# Patient Record
Sex: MALE | Race: BLACK OR AFRICAN AMERICAN | NOT HISPANIC OR LATINO | URBAN - METROPOLITAN AREA
[De-identification: names, ages, dates, MRNs, and addresses within clinical notes are randomized per-mention and may not be internally consistent; named-entity substitution may affect disease eponyms.]

---

## 2020-11-10 ENCOUNTER — EMERGENCY (EMERGENCY)
Facility: HOSPITAL | Age: 25
LOS: 0 days | Discharge: HOME | End: 2020-11-10
Attending: EMERGENCY MEDICINE | Admitting: EMERGENCY MEDICINE
Payer: MEDICAID

## 2020-11-10 VITALS
SYSTOLIC BLOOD PRESSURE: 128 MMHG | OXYGEN SATURATION: 97 % | HEART RATE: 105 BPM | RESPIRATION RATE: 20 BRPM | DIASTOLIC BLOOD PRESSURE: 76 MMHG | TEMPERATURE: 102 F

## 2020-11-10 DIAGNOSIS — J45.909 UNSPECIFIED ASTHMA, UNCOMPLICATED: ICD-10-CM

## 2020-11-10 DIAGNOSIS — Z79.2 LONG TERM (CURRENT) USE OF ANTIBIOTICS: ICD-10-CM

## 2020-11-10 DIAGNOSIS — J03.90 ACUTE TONSILLITIS, UNSPECIFIED: ICD-10-CM

## 2020-11-10 DIAGNOSIS — R11.2 NAUSEA WITH VOMITING, UNSPECIFIED: ICD-10-CM

## 2020-11-10 DIAGNOSIS — R10.9 UNSPECIFIED ABDOMINAL PAIN: ICD-10-CM

## 2020-11-10 LAB
ALBUMIN SERPL ELPH-MCNC: 4.8 G/DL — SIGNIFICANT CHANGE UP (ref 3.5–5.2)
ALP SERPL-CCNC: 63 U/L — SIGNIFICANT CHANGE UP (ref 30–115)
ALT FLD-CCNC: 9 U/L — SIGNIFICANT CHANGE UP (ref 0–41)
ANION GAP SERPL CALC-SCNC: 16 MMOL/L — HIGH (ref 7–14)
AST SERPL-CCNC: 16 U/L — SIGNIFICANT CHANGE UP (ref 0–41)
BASOPHILS # BLD AUTO: 0.02 K/UL — SIGNIFICANT CHANGE UP (ref 0–0.2)
BASOPHILS NFR BLD AUTO: 0.2 % — SIGNIFICANT CHANGE UP (ref 0–1)
BILIRUB SERPL-MCNC: 0.4 MG/DL — SIGNIFICANT CHANGE UP (ref 0.2–1.2)
BUN SERPL-MCNC: 11 MG/DL — SIGNIFICANT CHANGE UP (ref 10–20)
CALCIUM SERPL-MCNC: 9.7 MG/DL — SIGNIFICANT CHANGE UP (ref 8.5–10.1)
CHLORIDE SERPL-SCNC: 97 MMOL/L — LOW (ref 98–110)
CO2 SERPL-SCNC: 21 MMOL/L — SIGNIFICANT CHANGE UP (ref 17–32)
CREAT SERPL-MCNC: 1.1 MG/DL — SIGNIFICANT CHANGE UP (ref 0.7–1.5)
EOSINOPHIL # BLD AUTO: 0 K/UL — SIGNIFICANT CHANGE UP (ref 0–0.7)
EOSINOPHIL NFR BLD AUTO: 0 % — SIGNIFICANT CHANGE UP (ref 0–8)
GLUCOSE SERPL-MCNC: 106 MG/DL — HIGH (ref 70–99)
HCT VFR BLD CALC: 45 % — SIGNIFICANT CHANGE UP (ref 42–52)
HGB BLD-MCNC: 14.8 G/DL — SIGNIFICANT CHANGE UP (ref 14–18)
IMM GRANULOCYTES NFR BLD AUTO: 0.3 % — SIGNIFICANT CHANGE UP (ref 0.1–0.3)
LYMPHOCYTES # BLD AUTO: 1.34 K/UL — SIGNIFICANT CHANGE UP (ref 1.2–3.4)
LYMPHOCYTES # BLD AUTO: 12.7 % — LOW (ref 20.5–51.1)
MCHC RBC-ENTMCNC: 28 PG — SIGNIFICANT CHANGE UP (ref 27–31)
MCHC RBC-ENTMCNC: 32.9 G/DL — SIGNIFICANT CHANGE UP (ref 32–37)
MCV RBC AUTO: 85.1 FL — SIGNIFICANT CHANGE UP (ref 80–94)
MONOCYTES # BLD AUTO: 1.33 K/UL — HIGH (ref 0.1–0.6)
MONOCYTES NFR BLD AUTO: 12.6 % — HIGH (ref 1.7–9.3)
NEUTROPHILS # BLD AUTO: 7.85 K/UL — HIGH (ref 1.4–6.5)
NEUTROPHILS NFR BLD AUTO: 74.2 % — SIGNIFICANT CHANGE UP (ref 42.2–75.2)
NRBC # BLD: 0 /100 WBCS — SIGNIFICANT CHANGE UP (ref 0–0)
PLATELET # BLD AUTO: 177 K/UL — SIGNIFICANT CHANGE UP (ref 130–400)
POTASSIUM SERPL-MCNC: 3.5 MMOL/L — SIGNIFICANT CHANGE UP (ref 3.5–5)
POTASSIUM SERPL-SCNC: 3.5 MMOL/L — SIGNIFICANT CHANGE UP (ref 3.5–5)
PROT SERPL-MCNC: 8 G/DL — SIGNIFICANT CHANGE UP (ref 6–8)
RBC # BLD: 5.29 M/UL — SIGNIFICANT CHANGE UP (ref 4.7–6.1)
RBC # FLD: 12.4 % — SIGNIFICANT CHANGE UP (ref 11.5–14.5)
SODIUM SERPL-SCNC: 134 MMOL/L — LOW (ref 135–146)
WBC # BLD: 10.57 K/UL — SIGNIFICANT CHANGE UP (ref 4.8–10.8)
WBC # FLD AUTO: 10.57 K/UL — SIGNIFICANT CHANGE UP (ref 4.8–10.8)

## 2020-11-10 PROCEDURE — 99284 EMERGENCY DEPT VISIT MOD MDM: CPT

## 2020-11-10 RX ORDER — SODIUM CHLORIDE 9 MG/ML
1000 INJECTION, SOLUTION INTRAVENOUS ONCE
Refills: 0 | Status: COMPLETED | OUTPATIENT
Start: 2020-11-10 | End: 2020-11-10

## 2020-11-10 RX ORDER — AMOXICILLIN 250 MG/5ML
1 SUSPENSION, RECONSTITUTED, ORAL (ML) ORAL
Qty: 20 | Refills: 0
Start: 2020-11-10 | End: 2020-11-19

## 2020-11-10 RX ORDER — ONDANSETRON 8 MG/1
4 TABLET, FILM COATED ORAL ONCE
Refills: 0 | Status: COMPLETED | OUTPATIENT
Start: 2020-11-10 | End: 2020-11-10

## 2020-11-10 RX ORDER — KETOROLAC TROMETHAMINE 30 MG/ML
15 SYRINGE (ML) INJECTION ONCE
Refills: 0 | Status: DISCONTINUED | OUTPATIENT
Start: 2020-11-10 | End: 2020-11-10

## 2020-11-10 RX ADMIN — ONDANSETRON 4 MILLIGRAM(S): 8 TABLET, FILM COATED ORAL at 17:44

## 2020-11-10 RX ADMIN — Medication 15 MILLIGRAM(S): at 17:44

## 2020-11-10 RX ADMIN — SODIUM CHLORIDE 1000 MILLILITER(S): 9 INJECTION, SOLUTION INTRAVENOUS at 17:44

## 2020-11-10 NOTE — ED PROVIDER NOTE - NSFOLLOWUPINSTRUCTIONS_ED_ALL_ED_FT
Please follow up with your primary care provider in 1-3 days for further evaluation. Return to the emergency department sooner for any new or worsening symptoms.     Tonsillitis    WHAT YOU NEED TO KNOW:    Tonsillitis is inflammation of your tonsils. Tonsils are the lumps of tissue on both sides of the back of your throat. Tonsils are part of your immune system. They help you fight infections. Recurrent tonsillitis is when you have tonsillitis many times in 1 year. Chronic tonsillitis is when you have a sore throat that lasts 3 months or longer. Mouth Anatomy         DISCHARGE INSTRUCTIONS:    Medicines: You may need any of the following:     Acetaminophen decreases pain and fever. It is available without a doctor's order. Ask how much to take and how often to take it. Follow directions. Acetaminophen can cause liver damage if not taken correctly.      NSAIDs, such as ibuprofen, help decrease swelling, pain, and fever. This medicine is available with or without a doctor's order. NSAIDs can cause stomach bleeding or kidney problems in certain people. If you take blood thinner medicine, always ask your healthcare provider if NSAIDs are safe for you. Always read the medicine label and follow directions.      Antibiotics help treat a bacterial infection.      Take your medicine as directed. Contact your healthcare provider if you think your medicine is not helping or if you have side effects. Tell him or her if you are allergic to any medicine. Keep a list of the medicines, vitamins, and herbs you take. Include the amounts, and when and why you take them. Bring the list or the pill bottles to follow-up visits. Carry your medicine list with you in case of an emergency.    Call 911 for the following:     You have trouble breathing because your tonsils are swollen.        Contact your healthcare provider if:     You have a fever.      Your pain gets worse or does not get better after you take pain medicine.      Your sore throat is not better after you have finished antibiotic treatment.      You have trouble sleeping and wake up trying to catch your breath.      You have questions or concerns about your condition or care.    Rest when you feel it is needed. Slowly start to do more each day. Return to your daily activities as directed.     Drink liquids as directed: You may need to drink more liquid than usual to help prevent dehydration. Ask how much liquid to drink each day and which liquids are best for you.     Gargle with warm salt water: This may help decrease throat pain. Mix 1 teaspoon of salt in 8 ounces of warm water. Ask how often you should do this.    Prevent the spread of germs: Wash your hands often. Do not share food or drinks with anyone. You may be able to return to work when you feel better and your fever is gone for at least 24 hours.    Follow up with your healthcare provider as directed: Write down your questions so you remember to ask them during your visits.

## 2020-11-10 NOTE — ED PROVIDER NOTE - ATTENDING CONTRIBUTION TO CARE
24yo man h/o asthma, otherwise healthy, c/o nausea/vomiting over the last 4 days associated with subjective fever, sore throat, crampy abd pain. No chest pain, SOB, cough. On exam he is alert, looks uncomfortable due to nausea but not acutely toxic, throat with L tonsillar hypertrophy, erythema and exudate, uvula midline, lungs CTA, CVS1S2 RRR abd soft, NT, ND. Sx suggestive of viral syndrome vs strep pharyngitis, will tx symptomatically, reassess.    Of note, pt refused COVID swab despite explanation of risks of infecting others around him, etc. Advised that he will need to quarantine at home.

## 2020-11-10 NOTE — ED PROVIDER NOTE - NS ED ROS FT
Review of Systems:  	•	CONSTITUTIONAL - +subj fever, no diaphoresis  	•	SKIN - no rash, no lesions  	•	HEMATOLOGIC - no bleeding, no bruising  	•	EYES - no discharge, no injection  	•	ENT - +sore throat, no runny nose  	•	RESPIRATORY - no shortness of breath, no cough  	•	CARDIAC - no chest pain, no palpitations  	•	GI - +abd pain, +nausea, +vomiting, no diarrhea  	•	GENITO-URINARY - no dysuria, no hematuria  	•	MUSCULOSKELETAL - no joint pain, no muscle aches  	•	NEUROLOGIC - no dizziness, no headache

## 2020-11-10 NOTE — ED PROVIDER NOTE - PATIENT PORTAL LINK FT
You can access the FollowMyHealth Patient Portal offered by [facility] by registering at the following website: [email or web address]. By joining [facility]’s FollowMyHealth portal, you will also be able to view your health information using other applications (apps) compatible with our system.

## 2020-11-10 NOTE — ED PROVIDER NOTE - PHYSICAL EXAMINATION
Vital Signs: Reviewed  GEN: alert, NAD, speaks full sentences  HEAD:  normocephalic, atraumatic  EYES:  PERRLA; conjunctivae without injection, drainage or discharge  ENMT:  nasal mucosa moist; mouth moist without ulcerations or lesions; LT tonsil edematous w/ copious exudates, RT tonsil normal, no uvular deviation  NECK:  supple  CARDIAC:  regular rate, normal S1 and S2, no murmurs  RESP:  respiratory rate and effort appear normal for age; lungs are clear to auscultation bilaterally; no rales or wheezes  ABDOMEN:  soft, nontender, nondistended  MUSCULOSKELETAL/NEURO:  normal movement, normal tone  SKIN:  normal skin color for age and race, well-perfused; warm and dry

## 2020-11-10 NOTE — ED PROVIDER NOTE - PROGRESS NOTE DETAILS
AG: pt declined covid swab. Reports improved symptoms, tolerating PO. Strict return precautions given.

## 2020-11-10 NOTE — ED PROVIDER NOTE - CLINICAL SUMMARY MEDICAL DECISION MAKING FREE TEXT BOX
Labs ok, pt feeling much better with hydration and NSAIDs. Tolerating PO. Sx could be viral vs strep, pt declined throat swab and covid swab, will cover with abx, rec quarantine and PMD f/u.

## 2020-11-10 NOTE — ED PROVIDER NOTE - PSH
[facility] Discharge Instructions Endoscopy      5/28/2020    During your exam, the physician:    Completed a thorough exam, biopsy/biopsies obtained and Lab results will be called/mailed to you within 7-14 days.  If you have not heard from the doctor within 10 days, call the office for results:      DIET INSTRUCTIONS:  Resume your regular diet and Advance your diet as you tolerate it    PRESCRIPTIONS/MEDICATIONS  No new prescriptions given today    A RESPONSIBLE ADULT MUST ACCOMPANY YOU AND DRIVE YOU HOME    You had the following procedure(s) today:   Upper Endoscopy     1. A biopsy/biopsies obtained today  2. Following sedation, your judgment, perception and coordination are impaired for a minimum of 24 hours.   Therefore:  · Do not drive. Do not return to work today.  · It is strongly recommended to have someone stay with you at home the day of discharge and provide overnight care.   · Do not operate appliances or machinery that require quick reaction time  · Do not sign legal documents or be involved in work decisions  · Do not smoke or drink alcoholic beverages for 24 hours  · Plan to spend a few hours resting before resuming your normal routine    Please call your physician in the event that you experience any of the following or proceed to the nearest hospital in the event of an emergency:     UPPER ENDOSCOPY:    Difficulty swallowing or breathing  Neck swelling  Excessive pain, you may have mild chest pain or discomfort which should pass within 1-2 hours with the passage of air.  Nausea or Vomiting  Abdominal distention  Fever  A mild sore throat may follow this procedure.  Warm salt-water gargle or lozenges may relieve your discomfort.  ..    If you have any questions or concerns, contact Dr. Fernando Lott [phone]    [facility] [place] will be calling you within 3-5 business days to follow up after your procedure.     ADDITIONAL INSTRUCTIONS:   RECOMMENDATIONS:   We will follow up  with the biopsy results and make further recommendations as indicated.        No significant past surgical history

## 2020-11-10 NOTE — ED PROVIDER NOTE - OBJECTIVE STATEMENT
25yM pmhx asthma c/o nausea/vomiting x4 days; constant, NBNB, exacerbated by PO intake; states he has not been able to tolerate any food or fluids for 4 days; associated w/ subj fever/chills, sore throat, and abd discomfort; denies sick contacts or being around kids. Denies nasal congestion, diarrhea, chest pain, SOB, cough.

## 2020-11-10 NOTE — ED ADULT NURSE NOTE - OBJECTIVE STATEMENT
pt complaisning of nausea / vomiting, diarrhea. received from previous rn. in no distress. ok for dc.

## 2022-01-08 ENCOUNTER — OUTPATIENT (OUTPATIENT)
Dept: OUTPATIENT SERVICES | Facility: HOSPITAL | Age: 27
LOS: 1 days | Discharge: HOME | End: 2022-01-08

## 2022-01-08 DIAGNOSIS — Z20.828 CONTACT WITH AND (SUSPECTED) EXPOSURE TO OTHER VIRAL COMMUNICABLE DISEASES: ICD-10-CM

## 2022-01-08 PROBLEM — J45.909 UNSPECIFIED ASTHMA, UNCOMPLICATED: Chronic | Status: ACTIVE | Noted: 2020-11-10

## 2022-01-08 LAB — SARS-COV-2 RNA SPEC QL NAA+PROBE: DETECTED

## 2024-04-03 NOTE — ED PROVIDER NOTE - NS ED MD DISPO DISCHARGE
Home
Apixaban/Eliquis - Compliance/Apixaban/Eliquis - Dietary Advice/Apixaban/Eliquis - Follow up monitoring/Apixaban/Eliquis - Potential for adverse drug reactions and interactions

## 2025-04-11 NOTE — ED ADULT TRIAGE NOTE - TEMPERATURE IN CELSIUS (DEGREES C)
Patient: Gonzalez Dinh  : 1947    Encounter Date: 2025    Mr. Dinh was seen at [facility] today.  He is comfortable.  Denies any urinary symptoms.  Has no fever or chills.  Has no respiratory symptoms.  Denies any chest pain or palpitation.  Has no polyuria, polydipsia any other symptom uncontrolled hyperglycemia.  Denies any joint swelling or pain.  Has some symptoms of anxiety.  Review of other systems are negative.    Past medical history:      Urinary retention      Phimosis  Hyperglycemia  Obesity  Hyperlipidemia  Anemia  Urethral foreign body  Bilateral hydronephrosis  Depression  History of tibia and fibula fracture  Current cigar smoker  Obesity  Suicidal ideation  Anxiety  Knee osteoarthritis  Cerebral palsy   GERD  PBH    On examination:  General examination: Overweight  Vital signs: Heart rate is 70/min, respiratory rate is 18, blood pressure is 128/67, oxygen saturation is 98% room air  Eyes: There is no pallor or jaundice  Neck: There is no JVD or carotid bruit  Lungs: Breath sounds are normal  CVS: Heart sounds are regular there is no gallop murmur  Abdomen: Soft  Legs: No edema    Assessment and plan:  1.  Hyperlipidemia: Continue with the current dose of simvastatin  2.  Hypertension: Controlled on the current dose of lisinopril and amlodipine  3.  COPD: Stable on current inhalers  4.  Depression: Symptoms are controlled on the current dose of venlafaxine  5. History of urinary retention: Patient is on tamsulosin.  He did not have any further episode  6.  Gastroesophageal reflux disease: Symptoms are controlled on the pantoprazole.      Electronically Signed By: Jose Hernandez MD   25  4:40 PM   38.9